# Patient Record
Sex: FEMALE | Race: WHITE
[De-identification: names, ages, dates, MRNs, and addresses within clinical notes are randomized per-mention and may not be internally consistent; named-entity substitution may affect disease eponyms.]

---

## 2017-10-22 ENCOUNTER — HOSPITAL ENCOUNTER (EMERGENCY)
Dept: HOSPITAL 25 - UCCORT | Age: 32
Discharge: HOME | End: 2017-10-22
Payer: MEDICAID

## 2017-10-22 VITALS — SYSTOLIC BLOOD PRESSURE: 101 MMHG | DIASTOLIC BLOOD PRESSURE: 63 MMHG

## 2017-10-22 DIAGNOSIS — N39.0: Primary | ICD-10-CM

## 2017-10-22 DIAGNOSIS — Z88.2: ICD-10-CM

## 2017-10-22 PROCEDURE — 81003 URINALYSIS AUTO W/O SCOPE: CPT

## 2017-10-22 PROCEDURE — G0463 HOSPITAL OUTPT CLINIC VISIT: HCPCS

## 2017-10-22 PROCEDURE — 99211 OFF/OP EST MAY X REQ PHY/QHP: CPT

## 2017-10-22 PROCEDURE — 87086 URINE CULTURE/COLONY COUNT: CPT

## 2017-10-22 NOTE — UC
Complaint Female HPI





- HPI Summary


HPI Summary: 





Patient has increased urinary frequency, some low back pain.


no fever, no flank pain, sometimes feels ok, sometims symptomatic.





- History Of Current Complaint


Chief Complaint: UCGU


Stated Complaint: URINARY


Time Seen by Provider: 10/22/17 15:42


Hx Obtained From: Patient


Hx Last Menstrual Period: 10/09/17


Pregnant?: No


Onset/Duration: Sudden Onset, Lasting Days


Timing: Lasting Days


Severity Initially: Mild


Severity Currently: Mild


Character: Burning


Aggravating Factor(s): Urination


Associated Signs And Symptoms: Positive: Back Pain





- Allergies/Home Medications


Allergies/Adverse Reactions: 


 Allergies











Allergy/AdvReac Type Severity Reaction Status Date / Time


 


Sulfamethoxazole Allergy Severe Hives Verified 10/22/17 15:41





w/Trimethoprim     





[From Bactrim]     











Home Medications: 


 Home Medications





NK [No Home Medications Reported]  10/22/17 [History Confirmed 10/22/17]











PMH/Surg Hx/FS Hx/Imm Hx


Previously Healthy: Yes





- Surgical History


Surgical History: Yes


Surgery Procedure, Year, and Place: T&A, D&C-02





- Family History


Known Family History: Positive: Hypertension





- Social History


Alcohol Use: None


Substance Use Type: None


Smoking Status (MU): Never Smoked Tobacco





- Immunization History


Most Recent Influenza Vaccination: no





Review of Systems


Constitutional: Negative


Skin: Negative


Eyes: Negative


ENT: Negative


Respiratory: Negative


Cardiovascular: Negative


Gastrointestinal: Negative


Genitourinary: Dysuria, Frequency


Motor: Negative


Neurovascular: Negative


Musculoskeletal: Negative


Neurological: Negative


Psychological: Negative


Is Patient Immunocompromised?: No


All Other Systems Reviewed And Are Negative: Yes





Physical Exam


Triage Information Reviewed: Yes


Appearance: Well-Appearing, Well-Nourished, Pain Distress


Vital Signs: 


 Initial Vital Signs











Temp  98.3 F   10/22/17 15:37


 


Pulse  76   10/22/17 15:37


 


Resp  14   10/22/17 15:37


 


BP  101/63   10/22/17 15:37


 


Pulse Ox  100   10/22/17 15:37











Vital Signs Reviewed: Yes


Eye Exam: Normal


ENT Exam: Normal


Neck exam: Normal


Respiratory Exam: Normal


Cardiovascular Exam: Normal


Abdomen Description: Positive: Nontender, No Organomegaly, Soft, CVA Tenderness 

(R) - neg, CVA Tenderness (L) - neg


Bowel Sounds: Positive: Present


Musculoskeletal Exam: Normal


Neurological Exam: Normal


Psychological Exam: Normal


Skin Exam: Normal





 Complaint Female Dx





- Course


Course Of Treatment: hx obtained, exam performed ,meds reviewed, UA was neg, 

sent for culture, talked with patient who decided to wait for culture, if she 

remains symptomatic when culture returns will treat at that time.





- Differential Dx/Diagnosis


Differential Diagnosis/HQI/PQRI: Ureteral Stone, Urinary Tract Infection


Provider Diagnoses: UTI





Discharge





- Discharge Plan


Condition: Stable


Disposition: HOME


Patient Education Materials:  Urinary Tract Infection in Women (ED)


Referrals: 


Claritza Hill MD [Primary Care Provider] - 


Additional Instructions: 


1. increase fluid intake and continue with the cranberry pills.


2. If you remain symtpomatic when culture returns we will treat at that time.

## 2017-11-15 ENCOUNTER — HOSPITAL ENCOUNTER (EMERGENCY)
Dept: HOSPITAL 25 - UCCORT | Age: 32
Discharge: HOME | End: 2017-11-15
Payer: MEDICAID

## 2017-11-15 VITALS — DIASTOLIC BLOOD PRESSURE: 60 MMHG | SYSTOLIC BLOOD PRESSURE: 103 MMHG

## 2017-11-15 DIAGNOSIS — Z88.2: ICD-10-CM

## 2017-11-15 DIAGNOSIS — H10.31: Primary | ICD-10-CM

## 2017-11-15 PROCEDURE — G0463 HOSPITAL OUTPT CLINIC VISIT: HCPCS

## 2017-11-15 PROCEDURE — 99212 OFFICE O/P EST SF 10 MIN: CPT

## 2017-11-15 NOTE — UC
Eye Complaint HPI





- HPI Summary


HPI Summary: 





Right eye crusting and irritation. She has had URI symptoms lately. Daughter is 

also sick. No FB or trauma. She wears contacts but has thrown them out and 

thrown out the case. 





- History of Current Complaint


Chief Complaint: UCEye


Stated Complaint: RIGHT EYE COMPLAINT


Time Seen by Provider: 11/15/17 09:41


Hx Obtained From: Patient


Hx Last Menstrual Period: 11/3/17


Onset/Duration: Gradual Onset, Lasting Hours


Timing: Constant


Location of Injury: Conjunctiva


Character: Sharp - irritation. Mild.


Aggravating Factor(s): Nothing


Alleviating Factor(s): Nothing


Associated Signs And Symptoms: Positive: Drainage (Purulent).  Negative: 

Photophobia, Vision Impairment Bilateral, Vision Impairment Right, Vision 

Impairment Left, Fever, Swelling





- Allergies/Home Medications


Allergies/Adverse Reactions: 


 Allergies











Allergy/AdvReac Type Severity Reaction Status Date / Time


 


Sulfamethoxazole Allergy Severe Hives Verified 11/15/17 09:09





w/Trimethoprim     





[From Bactrim]     











Home Medications: 


 Home Medications





Multiple Vitamin [Multivitamins] 1 cap PO DAILY 11/15/17 [History Confirmed 11/

15/17]











PMH/Surg Hx/FS Hx/Imm Hx


Previously Healthy: Yes





- Surgical History


Surgical History: Yes


Surgery Procedure, Year, and Place: T&A, D&C-02





- Family History


Known Family History: Positive: Hypertension





- Social History


Lives: With Family


Alcohol Use: None


Substance Use Type: None


Smoking Status (MU): Never Smoked Tobacco





- Immunization History


Most Recent Influenza Vaccination: no





Review of Systems


Eyes: Drainage, Eye Redness


All Other Systems Reviewed And Are Negative: Yes





Physical Exam


Triage Information Reviewed: Yes


Appearance: Well-Appearing, No Pain Distress, Well-Nourished


Vital Signs: 


 Initial Vital Signs











Temp  98.1 F   11/15/17 09:10


 


Pulse  58   11/15/17 09:10


 


Resp  16   11/15/17 09:10


 


BP  103/60   11/15/17 09:10











Eye Exam: Normal


Eyes: Positive: Conjunctiva Inflamed.  Negative: Discharge


ENT: Positive: Normal ENT inspection, TMs normal, Uvula midline.  Negative: 

Pharynx normal, Pharyngeal erythema, Nasal congestion, Nasal drainage, 

Tonsillar swelling, Tonsillar exudate, Trismus, Muffled voice, Hoarse voice, 

Dental tenderness, Sinus tenderness


Neck exam: Normal


Neck: Positive: Supple, Nontender, No Lymphadenopathy


Respiratory: Positive: No respiratory distress, No accessory muscle use.  

Negative: Respiratory distress


Cardiovascular: Positive: Brisk Capillary Refill


Abdomen Description: Negative: Distended


Musculoskeletal: Positive: Strength Intact, ROM Intact, No Edema


Neurological: Positive: Alert, Muscle Tone Normal.  Negative: Fatigued


Skin: Negative: rashes





Eye Complaint Course/Dx





- Differential Dx/Diagnosis


Provider Diagnoses: conjunctivitis.





Discharge





- Discharge Plan


Condition: Good


Disposition: HOME


Prescriptions: 


Ciprofloxacin 0.3% OPTH.SOL* [Cipro 0.3% Opth*] 2 drop BOTH EYES Q4H #1 btl


Patient Education Materials:  Conjunctivitis (ED)


Referrals: 


Claritza Hill MD [Primary Care Provider] -

## 2019-06-12 ENCOUNTER — HOSPITAL ENCOUNTER (EMERGENCY)
Dept: HOSPITAL 25 - UCCORT | Age: 34
Discharge: HOME | End: 2019-06-12
Payer: COMMERCIAL

## 2019-06-12 VITALS — SYSTOLIC BLOOD PRESSURE: 121 MMHG | DIASTOLIC BLOOD PRESSURE: 65 MMHG

## 2019-06-12 DIAGNOSIS — J32.9: Primary | ICD-10-CM

## 2019-06-12 DIAGNOSIS — Z88.1: ICD-10-CM

## 2019-06-12 PROCEDURE — 99212 OFFICE O/P EST SF 10 MIN: CPT

## 2019-06-12 PROCEDURE — G0463 HOSPITAL OUTPT CLINIC VISIT: HCPCS

## 2019-06-12 NOTE — ED
Throat Pain/Nasal Congestion





- HPI Summary


HPI Summary: 





pt presents to  for evaluation of her sinus congestion. she states that she 

has had congestion, pain for over 1 week.  her kids have had similar symptoms 

and are on antibiotics.  she is a mother of 3.  her youngest one is 9 months 

and is still breast feeding.  





- History of Current Complaint


Chief Complaint: UCRespiratory


Hx Obtained From: Patient


Onset/Duration: Lasting Weeks - 1


Severity: Mild


Cough: Nonproductive





- Epiglottits Risk Factors


Epiglottis Risk Factors: Negative





- Allergies/Home Medications


Allergies/Adverse Reactions: 


 Allergies











Allergy/AdvReac Type Severity Reaction Status Date / Time


 


MS Sulfamethoxazole Allergy Severe Hives Verified 11/15/17 09:09





w/Trimethoprim     





[From Bactrim]     











Home Medications: 


 Home Medications





Fluticasone NASAL SPRAY 50MCG* [Flonase NASAL SPRAY 50MCG*] 2 spray BOTH NARES 

DAILY 06/12/19 [History Confirmed 06/12/19]











PMH/Surg Hx/FS Hx/Imm Hx


Previously Healthy: Yes





- Surgical History


Surgery Procedure, Year, and Place: T&A, D&C-02


Infectious Disease History: No


Infectious Disease History: 


   Denies: Traveled Outside the [place] in Last 30 Days





- Family History


Known Family History: Positive: Hypertension





- Social History


Alcohol Use: None


Substance Use Type: Reports: None


Smoking Status (MU): Never Smoked Tobacco





Review of Systems


Constitutional: Negative


Eyes: Negative


Positive: Nasal Discharge


Cardiovascular: Negative


Respiratory: Negative


Gastrointestinal: Negative


Genitourinary: Negative


Musculoskeletal: Negative


Skin: Negative


Neurological: Negative


Psychological: Normal


All Other Systems Reviewed And Are Negative: No





Physical Exam


Triage Information Reviewed: Yes


Vital Signs On Initial Exam: 


 Initial Vitals











Temp Pulse Resp BP Pulse Ox


 


 99.7 F   77   10   121/65   100 


 


 06/12/19 14:56  06/12/19 14:56  06/12/19 14:56  06/12/19 14:56  06/12/19 14:56











Vital Signs Reviewed: Yes


Appearance: Positive: Well-Appearing, No Pain Distress, Well-Nourished


Skin: Positive: Warm, Dry


Eyes: Positive: Normal, EOMI, TYLER


ENT: Positive: Normal ENT inspection, Sinus tenderness


Neck: Positive: Supple, Nontender


Respiratory/Lung Sounds: Positive: Clear to Auscultation, Breath Sounds Present


Cardiovascular: Positive: Normal, RRR


Abdomen Description: Positive: Nontender, Soft


Bowel Sounds: Positive: Present


Musculoskeletal: Positive: Normal, Strength/ROM Intact


Neurological: Positive: Normal, Sensory/Motor Intact, Alert, Oriented to Person 

Place, Time, CN Intact II-III


Psychiatric: Positive: Normal


AVPU Assessment: Alert





Diagnostics





- Vital Signs


 Vital Signs











  Temp Pulse Resp BP Pulse Ox


 


 06/12/19 14:56  99.7 F  77  10  121/65  100














- Laboratory


Lab Statement: Any lab studies that have been ordered have been reviewed, and 

results considered in the medical decision making process.





EENT Course/Dx





- Course


Course Of Treatment: pt's exam and history is consistent with a sinusitis.  

will rx abx and discharge. pt encouraged to also take tylenol and motrin for 

pain and discomfort and to use over the counter sinus decongestants.





- Diagnoses


Provider Diagnoses: 


 Sinusitis








Discharge





- Sign-Out/Discharge


Documenting (check all that apply): Patient Departure


All imaging exams completed and their final reports reviewed: No Studies





- Discharge Plan


Condition: Stable


Disposition: HOME


Prescriptions: 


Amoxicillin 500 mg PO TID #30 capsule


Patient Education Materials:  Sinusitis (ED)


Referrals: 


Blanca Gayle MD [Primary Care Provider] - 


Additional Instructions: 


You may take tylenol and motrin for any sinus pain.  follow up with your 

doctor.  return if worse or any new symptoms. 





- Billing Disposition and Condition


Condition: STABLE


Disposition: Home

## 2020-01-10 ENCOUNTER — HOSPITAL ENCOUNTER (EMERGENCY)
Dept: HOSPITAL 25 - UCCORT | Age: 35
Discharge: HOME | End: 2020-01-10
Payer: COMMERCIAL

## 2020-01-10 VITALS — DIASTOLIC BLOOD PRESSURE: 64 MMHG | SYSTOLIC BLOOD PRESSURE: 110 MMHG

## 2020-01-10 DIAGNOSIS — R51: ICD-10-CM

## 2020-01-10 DIAGNOSIS — Z88.2: ICD-10-CM

## 2020-01-10 DIAGNOSIS — J22: Primary | ICD-10-CM

## 2020-01-10 PROCEDURE — G0463 HOSPITAL OUTPT CLINIC VISIT: HCPCS

## 2020-01-10 PROCEDURE — 99212 OFFICE O/P EST SF 10 MIN: CPT

## 2020-01-10 NOTE — UC
Respiratory Complaint HPI





- HPI Summary


HPI Summary: 





35 yo with symptoms consistent with CHRISTINA 2 weeks ago, with malaise, fever and 

cough. 





- History of Current Complaint


Chief Complaint: UCRespiratory


Stated Complaint: COUGH


Time Seen by Provider: 01/10/20 13:59


Hx Obtained From: Patient


Hx Last Menstrual Period: 12/22/19


Onset/Duration: Gradual Onset, Lasting Weeks, Worse Since - 2 days of increased 

cough and shortness of breath, low grade fever.


Timing: Constant


Severity Initially: Moderate


Severity Currently: Moderate


Pain Intensity: 5


Character: Cough: Productive


Aggravating Factors: Deep Breaths, Recumbent Position


Alleviating Factors: Nothing


Associated Signs And Symptoms: Positive: Dyspnea, Fever, Nasal Congestion, 

Hoarseness





- Allergies/Home Medications


Allergies/Adverse Reactions: 


 Allergies











Allergy/AdvReac Type Severity Reaction Status Date / Time


 


sulfamethoxazole Allergy  Hives Verified 01/10/20 14:00





[From Bactrim]     


 


trimethoprim [From Bactrim] Allergy  Hives Verified 01/10/20 14:00











Home Medications: 


 Home Medications





Acetaminophen [Acetaminophen Extra Strength] 1,000 mg PO Q6H PRN 01/10/20 [

History Confirmed 01/10/20]


Guaifenesin/Dextromethorphan [Mucinex Dm Maximum Streng  mg] 1 tab PO 

Q12H PRN 01/10/20 [History Confirmed 01/10/20]











PMH/Surg Hx/FS Hx/Imm Hx


Previously Healthy: Yes





- Surgical History


Surgical History: Yes


Surgery Procedure, Year, and Place: D&C 2002; Tonsillectomy, ~1996





- Family History


Known Family History: Positive: Hypertension





- Social History


Occupation: Employed Full-time - [employer] with 3 children


Lives: With Family


Alcohol Use: None


Substance Use Type: None


Smoking Status (MU): Never Smoked Tobacco





- Immunization History


Most Recent Influenza Vaccination: no





Review of Systems


All Other Systems Reviewed And Are Negative: Yes


Constitutional: Positive: Fever, Fatigue


ENT: Positive: Sore Throat, Sinus Congestion


Respiratory: Positive: Shortness Of Breath, Cough


Cardiovascular: Negative: Palpitations, Chest Pain


Gastrointestinal: Negative: Vomiting, Diarrhea


Genitourinary: Positive: Negative


Motor: Positive: Negative


Neurovascular: Positive: Negative


Musculoskeletal: Positive: Negative


Neurological: Positive: Headache


Psychological: Positive: Negative


Is Patient Immunocompromised?: No





Physical Exam


Triage Information Reviewed: Yes


Appearance: No Pain Distress, Ill-Appearing - looks fatigued and unwell


Vital Signs: 


 Initial Vital Signs











Temp  99 F   01/10/20 13:57


 


Pulse  94   01/10/20 13:57


 


Resp  16   01/10/20 13:57


 


BP  110/64   01/10/20 13:57


 


Pulse Ox  100   01/10/20 13:57











Eyes: Positive: Conjunctiva Clear


ENT: Positive: Pharyngeal erythema, TM dull - left serous fluid, Hoarse voice


Neck: Positive: Supple, Nontender, No Lymphadenopathy


Respiratory: Positive: No respiratory distress, Decreased breath sounds, 

Rhonchi - coarse breath sounds throughout


Cardiovascular: Positive: RRR, No Murmur


Musculoskeletal Exam: Normal


Neurological Exam: Normal


Psychological Exam: Normal


Skin Exam: Normal





Respiratory Course/Dx





- Course


Course Of Treatment: 


Progression of illness suggestive of influenza with progression to lower 

respiratory infection. Will treat with azithromycin and tessalong perles. 





- Differential Dx/Diagnosis


Differential Diagnosis/HQI/PQRI: Bronchitis, Influenza, Lower Resp Infection, 

Sinusitis


Provider Diagnosis: 


 Lower respiratory tract infection








Discharge ED





- Sign-Out/Discharge


Documenting (check all that apply): Patient Departure


All imaging exams completed and their final reports reviewed: No Studies





- Discharge Plan


Condition: Stable


Disposition: HOME


Prescriptions: 


Azithromyxin WENDY (NF) [Z-Wendy (Zithromax) 250 mg tabs #6] 2 tab PO .TODAY, THEN 

1 DAILY #6 tab


Benzonatate 200 mg PO TID PRN #30 capsule


 PRN Reason: Cough


Patient Education Materials:  Acute Bronchitis (ED)


Referrals: 


Eric Hare MD [Primary Care Provider] - 


Additional Instructions: 


Please take the full course of antibiotics, and increase rest and maintain a 

high fluid intake. 


Use tessalon perles as needed for suppression of cough. Follow up if you have 

persistent fever or shortness of breath. 





- Billing Disposition and Condition


Condition: STABLE


Disposition: Home